# Patient Record
Sex: FEMALE | Race: WHITE | NOT HISPANIC OR LATINO
[De-identification: names, ages, dates, MRNs, and addresses within clinical notes are randomized per-mention and may not be internally consistent; named-entity substitution may affect disease eponyms.]

---
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
## 2020-07-13 RX ORDER — FLUTICASONE PROPIONATE 0.05 MG/G
0.01 OINTMENT TOPICAL TWICE DAILY
Qty: 1 | Refills: 3 | Status: COMPLETED | COMMUNITY
Start: 2019-06-13 | End: 2020-07-13

## 2020-07-13 RX ORDER — LEVOCETIRIZINE DIHYDROCHLORIDE 5 MG/1
5 TABLET ORAL DAILY
Qty: 30 | Refills: 0 | Status: COMPLETED | COMMUNITY
Start: 2018-11-23 | End: 2020-07-13

## 2020-07-18 ENCOUNTER — APPOINTMENT (OUTPATIENT)
Dept: PEDIATRICS | Facility: CLINIC | Age: 20
End: 2020-07-18
Payer: COMMERCIAL

## 2020-07-18 VITALS
SYSTOLIC BLOOD PRESSURE: 108 MMHG | BODY MASS INDEX: 19.53 KG/M2 | WEIGHT: 121.5 LBS | DIASTOLIC BLOOD PRESSURE: 66 MMHG | HEIGHT: 66.25 IN

## 2020-07-18 PROCEDURE — 86580 TB INTRADERMAL TEST: CPT

## 2020-07-18 PROCEDURE — 99395 PREV VISIT EST AGE 18-39: CPT | Mod: 25

## 2020-07-18 NOTE — HISTORY OF PRESENT ILLNESS
[Up to date] : Up to date [Normal] : normal [Eats meals with family] : eats meals with family [Father] : father [Normal Performance] : normal performance [Normal Behavior/Attention] : normal behavior/attention [Normal Homework] : normal homework [Has friends] : has friends [At least 1 hour of physical activity a day] : at least 1 hour of physical activity a day [No] : No cigarette smoke exposure [Has peer relationships free of violence] : has peer relationships free of violence [Uses safety belts/safety equipment] : uses safety belts/safety equipment  [Has ways to cope with stress] : has ways to cope with stress [Yes] : Patient has had sexual intercourse. [HIV Screening Declined] : HIV Screening Declined [Displays self-confidence] : displays self-confidence [Uses electronic nicotine delivery system] : does not use electronic nicotine delivery system [Uses tobacco] : does not use tobacco [Drinks alcohol] : does not drink alcohol [Impaired/distracted driving] : no impaired/distracted driving [de-identified] : [employer] [de-identified] : Monogamous [FreeTextEntry1] : 18 yo for HM visit,immunizations UTD

## 2020-07-18 NOTE — PHYSICAL EXAM
[Alert] : alert [No Acute Distress] : no acute distress [Normocephalic] : normocephalic [EOMI Bilateral] : EOMI bilateral [Clear tympanic membranes with bony landmarks and light reflex present bilaterally] : clear tympanic membranes with bony landmarks and light reflex present bilaterally  [Pink Nasal Mucosa] : pink nasal mucosa [Nonerythematous Oropharynx] : nonerythematous oropharynx [Supple, full passive range of motion] : supple, full passive range of motion [No Palpable Masses] : no palpable masses [Clear to Auscultation Bilaterally] : clear to auscultation bilaterally [Regular Rate and Rhythm] : regular rate and rhythm [Normal S1, S2 audible] : normal S1, S2 audible [No Murmurs] : no murmurs [+2 Femoral Pulses] : +2 femoral pulses [Soft] : soft [NonTender] : non tender [Normoactive Bowel Sounds] : normoactive bowel sounds [Non Distended] : non distended [No Splenomegaly] : no splenomegaly [No Hepatomegaly] : no hepatomegaly [Luis Miguel: _____] : Luis Miguel [unfilled] [No Abnormal Lymph Nodes Palpated] : no abnormal lymph nodes palpated [Normal External Genitalia] : normal external genitalia [No Gait Asymmetry] : no gait asymmetry [No pain or deformities with palpation of bone, muscles, joints] : no pain or deformities with palpation of bone, muscles, joints [Normal Muscle Tone] : normal muscle tone [+2 Patella DTR] : +2 patella DTR [Straight] : straight [No Rash or Lesions] : no rash or lesions [Cranial Nerves Grossly Intact] : cranial nerves grossly intact

## 2020-07-18 NOTE — DISCUSSION/SUMMARY
[Met privately with the adolescent for part of the office visit?] : Met privately with the adolescent for part of the office visit? Yes [Adolescent demonstrates understanding of his/her conditions and how to take prescribed medications?] : Adolescent demonstrates understanding of his/her conditions and how to take prescribed medications? Yes [Adolescent asks questions during each office  visit and participates in the care plan?] : Adolescent asks questions during each office visit and participates in the care plan? Yes [Initiated discussion about transfer to an adult healthcare provider.  Provided copy of transition letter?] : Initiated discussion about transfer to an adult healthcare provider.  Provided copy of transition letter? Yes [Transferred health records?] : Transferred health records? No [FreeTextEntry1] : 18 yo for HM exam and PPD\par no desire to transition\par wt loss 4.5 lbs in past year\par PE Unremarkable\par PPD planted\par Hep B titers ordered\par discussed need for Flu Vax, Continue Covid precautions\par Questions answered\par

## 2020-07-20 ENCOUNTER — APPOINTMENT (OUTPATIENT)
Dept: PEDIATRICS | Facility: CLINIC | Age: 20
End: 2020-07-20
Payer: COMMERCIAL

## 2020-07-20 VITALS — HEIGHT: 66 IN | WEIGHT: 120 LBS | BODY MASS INDEX: 19.29 KG/M2

## 2020-07-20 PROCEDURE — 86580 TB INTRADERMAL TEST: CPT

## 2020-07-20 RX ORDER — NORETHINDRONE ACETATE AND ETHINYL ESTRADIOL 1MG-20(24)
1-20 KIT ORAL
Qty: 84 | Refills: 0 | Status: ACTIVE | COMMUNITY
Start: 2018-09-12

## 2020-07-27 ENCOUNTER — APPOINTMENT (OUTPATIENT)
Dept: PEDIATRICS | Facility: CLINIC | Age: 20
End: 2020-07-27
Payer: COMMERCIAL

## 2020-07-27 PROCEDURE — 86580 TB INTRADERMAL TEST: CPT

## 2020-07-29 ENCOUNTER — APPOINTMENT (OUTPATIENT)
Dept: PEDIATRICS | Facility: CLINIC | Age: 20
End: 2020-07-29
Payer: COMMERCIAL

## 2020-07-29 PROCEDURE — 99211 OFF/OP EST MAY X REQ PHY/QHP: CPT

## 2020-08-15 LAB — HBV SURFACE AB SER QL: NONREACTIVE

## 2020-11-17 RX ORDER — CLINDAMYCIN PHOSPHATE 1 G/10ML
1 GEL TOPICAL TWICE DAILY
Qty: 1 | Refills: 3 | Status: ACTIVE | COMMUNITY
Start: 2018-08-15 | End: 1900-01-01

## 2020-12-02 ENCOUNTER — APPOINTMENT (OUTPATIENT)
Dept: PEDIATRICS | Facility: CLINIC | Age: 20
End: 2020-12-02
Payer: COMMERCIAL

## 2020-12-02 PROCEDURE — 99072 ADDL SUPL MATRL&STAF TM PHE: CPT

## 2020-12-02 PROCEDURE — 90471 IMMUNIZATION ADMIN: CPT

## 2020-12-02 PROCEDURE — 90746 HEPB VACCINE 3 DOSE ADULT IM: CPT

## 2020-12-02 NOTE — HISTORY OF PRESENT ILLNESS
[Hepatitis B] : Hepatitis B [FreeTextEntry1] : MALU is here for vaccine update, no complaints, last well check up 7/18/2020. 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 No concerns.\par

## 2021-01-05 ENCOUNTER — APPOINTMENT (OUTPATIENT)
Dept: PEDIATRICS | Facility: CLINIC | Age: 21
End: 2021-01-05
Payer: COMMERCIAL

## 2021-01-05 PROCEDURE — 99072 ADDL SUPL MATRL&STAF TM PHE: CPT

## 2021-01-05 PROCEDURE — 90746 HEPB VACCINE 3 DOSE ADULT IM: CPT

## 2021-01-05 PROCEDURE — 90471 IMMUNIZATION ADMIN: CPT

## 2021-01-05 NOTE — DISCUSSION/SUMMARY
[] : The components of the vaccine(s) to be administered today are listed in the plan of care. The disease(s) for which the vaccine(s) are intended to prevent and the risks have been discussed with the caretaker.  The risks are also included in the appropriate vaccination information statements which have been provided to the patient's caregiver.  The caregiver has given consent to vaccinate. [FreeTextEntry1] : Call for fever or problems, #3 in 6 months\par

## 2021-01-05 NOTE — HISTORY OF PRESENT ILLNESS
[Hepatitis B] : Hepatitis B [FreeTextEntry1] : MALU is here for vaccine update, no complaints, last well check up 7/18/2020. 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

## 2021-03-23 DIAGNOSIS — Z00.00 ENCOUNTER FOR GENERAL ADULT MEDICAL EXAMINATION W/OUT ABNORMAL FINDINGS: ICD-10-CM

## 2021-05-25 ENCOUNTER — APPOINTMENT (OUTPATIENT)
Dept: PEDIATRICS | Facility: CLINIC | Age: 21
End: 2021-05-25
Payer: COMMERCIAL

## 2021-05-25 VITALS — WEIGHT: 118 LBS | TEMPERATURE: 97.9 F

## 2021-05-25 DIAGNOSIS — Z87.09 PERSONAL HISTORY OF OTHER DISEASES OF THE RESPIRATORY SYSTEM: ICD-10-CM

## 2021-05-25 DIAGNOSIS — L24.9 IRRITANT CONTACT DERMATITIS, UNSPECIFIED CAUSE: ICD-10-CM

## 2021-05-25 DIAGNOSIS — K14.0 GLOSSITIS: ICD-10-CM

## 2021-05-25 DIAGNOSIS — H10.12 ACUTE ATOPIC CONJUNCTIVITIS, LEFT EYE: ICD-10-CM

## 2021-05-25 PROCEDURE — 99213 OFFICE O/P EST LOW 20 MIN: CPT

## 2021-05-25 PROCEDURE — 99072 ADDL SUPL MATRL&STAF TM PHE: CPT

## 2021-05-25 RX ORDER — FLUTICASONE PROPIONATE 0.5 MG/G
0.05 CREAM TOPICAL TWICE DAILY
Qty: 1 | Refills: 1 | Status: ACTIVE | COMMUNITY
Start: 2021-05-25 | End: 1900-01-01

## 2021-05-25 RX ORDER — DAPSONE 50 MG/G
5 GEL TOPICAL
Qty: 1 | Refills: 3 | Status: ACTIVE | COMMUNITY
Start: 2021-05-25 | End: 1900-01-01

## 2021-05-25 RX ORDER — FLUTICASONE PROPIONATE 0.05 MG/G
0.01 OINTMENT TOPICAL TWICE DAILY
Qty: 1 | Refills: 2 | Status: COMPLETED | COMMUNITY
Start: 2020-08-12 | End: 2021-05-25

## 2021-05-25 NOTE — DISCUSSION/SUMMARY
[FreeTextEntry1] : symptomatic treatment of rash, skin care, hot wash linens, alternate creams, moistuizer in between, she knows it will worsen in sun or sweat, call if no better. She has an apt in 2 weeks, if the rash persists we will consult with Allergy.\par

## 2021-05-25 NOTE — PHYSICAL EXAM
[NL] : clear to auscultation bilaterally [FreeTextEntry5] : no redness, no discharge [de-identified] : papular scattered lesions on upper chest, upper arms and posterior trunk, one lesion on her abdomen, spares face, legs and feet

## 2021-05-25 NOTE — HISTORY OF PRESENT ILLNESS
[de-identified] : rash [FreeTextEntry6] : Renuka is here with complaints of a recurrent rash on her chest that comes and goes. She was seen last year by her DERM and told it was acne but there is no improvement with acne cream and since 2 days it has spread to her upper arms and back. She noticed it (she has photos) at school this year but Sunday woke with it much worse than before. She went to the beach that day and now it is on her back and arms. The rash is not itchy, tender or draining, no new food or soap. She switched to hypoallergenic soap. No bug exposure, no one else in home has rash. She is eating and sleeping normally, no cold sx, no COVID vaccine yet.

## 2021-06-10 ENCOUNTER — APPOINTMENT (OUTPATIENT)
Dept: PEDIATRICS | Facility: CLINIC | Age: 21
End: 2021-06-10

## 2021-07-15 ENCOUNTER — APPOINTMENT (OUTPATIENT)
Dept: PEDIATRICS | Facility: CLINIC | Age: 21
End: 2021-07-15
Payer: COMMERCIAL

## 2021-07-15 DIAGNOSIS — Z23 ENCOUNTER FOR IMMUNIZATION: ICD-10-CM

## 2021-07-15 PROCEDURE — 86580 TB INTRADERMAL TEST: CPT

## 2021-07-15 PROCEDURE — 99072 ADDL SUPL MATRL&STAF TM PHE: CPT

## 2021-07-15 PROCEDURE — 90746 HEPB VACCINE 3 DOSE ADULT IM: CPT

## 2021-07-15 PROCEDURE — 90471 IMMUNIZATION ADMIN: CPT

## 2021-07-15 NOTE — DISCUSSION/SUMMARY
[] : The components of the vaccine(s) to be administered today are listed in the plan of care. The disease(s) for which the vaccine(s) are intended to prevent and the risks have been discussed with the caretaker.  The risks are also included in the appropriate vaccination information statements which have been provided to the patient's caregiver.  The caregiver has given consent to vaccinate. [FreeTextEntry1] : Call for fever or problems, PPD check in 2 days left forearm\par

## 2021-07-15 NOTE — HISTORY OF PRESENT ILLNESS
[Hepatitis B] : Hepatitis B [PPD] : PPD [FreeTextEntry1] : MALU is here for vaccine update, no complaints, last well check up 7/18/2020.\par

## 2021-07-17 ENCOUNTER — APPOINTMENT (OUTPATIENT)
Dept: PEDIATRICS | Facility: CLINIC | Age: 21
End: 2021-07-17
Payer: COMMERCIAL

## 2021-07-17 PROCEDURE — ZZZZZ: CPT

## 2021-07-20 PROBLEM — Z11.1 ENCOUNTER FOR PPD TEST: Status: RESOLVED | Noted: 2021-07-15 | Resolved: 2021-07-22

## 2021-07-20 PROBLEM — Z02.89 ENCOUNTER FOR COMPLETION OF FORM WITH PATIENT: Status: RESOLVED | Noted: 2021-07-15 | Resolved: 2021-07-22

## 2021-07-22 ENCOUNTER — APPOINTMENT (OUTPATIENT)
Dept: PEDIATRICS | Facility: CLINIC | Age: 21
End: 2021-07-22
Payer: COMMERCIAL

## 2021-07-22 DIAGNOSIS — Z02.89 ENCOUNTER FOR OTHER ADMINISTRATIVE EXAMINATIONS: ICD-10-CM

## 2021-07-22 DIAGNOSIS — Z11.1 ENCOUNTER FOR SCREENING FOR RESPIRATORY TUBERCULOSIS: ICD-10-CM

## 2021-07-22 PROCEDURE — 99211 OFF/OP EST MAY X REQ PHY/QHP: CPT | Mod: 25

## 2021-07-22 PROCEDURE — 86580 TB INTRADERMAL TEST: CPT

## 2021-07-22 PROCEDURE — 99072 ADDL SUPL MATRL&STAF TM PHE: CPT

## 2021-07-22 NOTE — HISTORY OF PRESENT ILLNESS
[PPD] : PPD [FreeTextEntry1] : MALU is here for PPD placement, no complaints, well check up 7/18/2020. This is her second PPD. Her first 7/15/21 was NEG.\par

## 2021-07-24 ENCOUNTER — APPOINTMENT (OUTPATIENT)
Dept: PEDIATRICS | Facility: CLINIC | Age: 21
End: 2021-07-24
Payer: COMMERCIAL

## 2021-07-24 PROCEDURE — ZZZZZ: CPT

## 2023-07-15 NOTE — HISTORY OF PRESENT ILLNESS
No
[FreeTextEntry6] : H/o a rash on the chest and back which is pruritic, no known cause. It was noted about one week ago and is spreading.

## 2024-02-11 ENCOUNTER — NON-APPOINTMENT (OUTPATIENT)
Age: 24
End: 2024-02-11

## 2024-02-12 ENCOUNTER — EMERGENCY (EMERGENCY)
Facility: HOSPITAL | Age: 24
LOS: 1 days | Discharge: HOME | End: 2024-02-12
Admitting: STUDENT IN AN ORGANIZED HEALTH CARE EDUCATION/TRAINING PROGRAM
Payer: COMMERCIAL

## 2024-02-12 VITALS
HEART RATE: 109 BPM | OXYGEN SATURATION: 98 % | SYSTOLIC BLOOD PRESSURE: 124 MMHG | TEMPERATURE: 100 F | DIASTOLIC BLOOD PRESSURE: 82 MMHG | RESPIRATION RATE: 20 BRPM

## 2024-02-12 VITALS
TEMPERATURE: 98 F | DIASTOLIC BLOOD PRESSURE: 68 MMHG | OXYGEN SATURATION: 99 % | SYSTOLIC BLOOD PRESSURE: 102 MMHG | RESPIRATION RATE: 18 BRPM | HEART RATE: 86 BPM

## 2024-02-12 LAB
ALBUMIN SERPL ELPH-MCNC: 4 G/DL — SIGNIFICANT CHANGE UP (ref 3.3–5)
ALP SERPL-CCNC: 68 U/L — SIGNIFICANT CHANGE UP (ref 40–120)
ALT FLD-CCNC: 18 U/L — SIGNIFICANT CHANGE UP (ref 4–33)
ANION GAP SERPL CALC-SCNC: 12 MMOL/L — SIGNIFICANT CHANGE UP (ref 7–14)
AST SERPL-CCNC: 19 U/L — SIGNIFICANT CHANGE UP (ref 4–32)
BASOPHILS # BLD AUTO: 0.04 K/UL — SIGNIFICANT CHANGE UP (ref 0–0.2)
BASOPHILS NFR BLD AUTO: 0.2 % — SIGNIFICANT CHANGE UP (ref 0–2)
BILIRUB SERPL-MCNC: 0.5 MG/DL — SIGNIFICANT CHANGE UP (ref 0.2–1.2)
BUN SERPL-MCNC: 10 MG/DL — SIGNIFICANT CHANGE UP (ref 7–23)
CALCIUM SERPL-MCNC: 8.9 MG/DL — SIGNIFICANT CHANGE UP (ref 8.4–10.5)
CHLORIDE SERPL-SCNC: 103 MMOL/L — SIGNIFICANT CHANGE UP (ref 98–107)
CO2 SERPL-SCNC: 22 MMOL/L — SIGNIFICANT CHANGE UP (ref 22–31)
CREAT SERPL-MCNC: 0.61 MG/DL — SIGNIFICANT CHANGE UP (ref 0.5–1.3)
EGFR: 129 ML/MIN/1.73M2 — SIGNIFICANT CHANGE UP
EOSINOPHIL # BLD AUTO: 0 K/UL — SIGNIFICANT CHANGE UP (ref 0–0.5)
EOSINOPHIL NFR BLD AUTO: 0 % — SIGNIFICANT CHANGE UP (ref 0–6)
GLUCOSE SERPL-MCNC: 96 MG/DL — SIGNIFICANT CHANGE UP (ref 70–99)
HCT VFR BLD CALC: 36.2 % — SIGNIFICANT CHANGE UP (ref 34.5–45)
HGB BLD-MCNC: 12.6 G/DL — SIGNIFICANT CHANGE UP (ref 11.5–15.5)
IANC: 16.43 K/UL — HIGH (ref 1.8–7.4)
IMM GRANULOCYTES NFR BLD AUTO: 0.4 % — SIGNIFICANT CHANGE UP (ref 0–0.9)
LYMPHOCYTES # BLD AUTO: 0.29 K/UL — LOW (ref 1–3.3)
LYMPHOCYTES # BLD AUTO: 1.7 % — LOW (ref 13–44)
MCHC RBC-ENTMCNC: 30.7 PG — SIGNIFICANT CHANGE UP (ref 27–34)
MCHC RBC-ENTMCNC: 34.8 GM/DL — SIGNIFICANT CHANGE UP (ref 32–36)
MCV RBC AUTO: 88.1 FL — SIGNIFICANT CHANGE UP (ref 80–100)
MONOCYTES # BLD AUTO: 0.15 K/UL — SIGNIFICANT CHANGE UP (ref 0–0.9)
MONOCYTES NFR BLD AUTO: 0.9 % — LOW (ref 2–14)
NEUTROPHILS # BLD AUTO: 16.43 K/UL — HIGH (ref 1.8–7.4)
NEUTROPHILS NFR BLD AUTO: 96.8 % — HIGH (ref 43–77)
NRBC # BLD: 0 /100 WBCS — SIGNIFICANT CHANGE UP (ref 0–0)
NRBC # FLD: 0 K/UL — SIGNIFICANT CHANGE UP (ref 0–0)
PLATELET # BLD AUTO: 335 K/UL — SIGNIFICANT CHANGE UP (ref 150–400)
POTASSIUM SERPL-MCNC: 3.4 MMOL/L — LOW (ref 3.5–5.3)
POTASSIUM SERPL-SCNC: 3.4 MMOL/L — LOW (ref 3.5–5.3)
PROT SERPL-MCNC: 7.4 G/DL — SIGNIFICANT CHANGE UP (ref 6–8.3)
RBC # BLD: 4.11 M/UL — SIGNIFICANT CHANGE UP (ref 3.8–5.2)
RBC # FLD: 13.1 % — SIGNIFICANT CHANGE UP (ref 10.3–14.5)
SODIUM SERPL-SCNC: 137 MMOL/L — SIGNIFICANT CHANGE UP (ref 135–145)
WBC # BLD: 16.98 K/UL — HIGH (ref 3.8–10.5)
WBC # FLD AUTO: 16.98 K/UL — HIGH (ref 3.8–10.5)

## 2024-02-12 PROCEDURE — 70491 CT SOFT TISSUE NECK W/DYE: CPT | Mod: 26,MA

## 2024-02-12 PROCEDURE — 99285 EMERGENCY DEPT VISIT HI MDM: CPT

## 2024-02-12 RX ORDER — SODIUM CHLORIDE 9 MG/ML
1000 INJECTION INTRAMUSCULAR; INTRAVENOUS; SUBCUTANEOUS ONCE
Refills: 0 | Status: COMPLETED | OUTPATIENT
Start: 2024-02-12 | End: 2024-02-12

## 2024-02-12 RX ORDER — AZITHROMYCIN 500 MG/1
500 TABLET, FILM COATED ORAL ONCE
Refills: 0 | Status: COMPLETED | OUTPATIENT
Start: 2024-02-12 | End: 2024-02-12

## 2024-02-12 RX ORDER — ACETAMINOPHEN 500 MG
1000 TABLET ORAL ONCE
Refills: 0 | Status: COMPLETED | OUTPATIENT
Start: 2024-02-12 | End: 2024-02-12

## 2024-02-12 RX ORDER — POTASSIUM CHLORIDE 20 MEQ
40 PACKET (EA) ORAL ONCE
Refills: 0 | Status: COMPLETED | OUTPATIENT
Start: 2024-02-12 | End: 2024-02-12

## 2024-02-12 RX ORDER — IBUPROFEN 200 MG
1 TABLET ORAL
Qty: 16 | Refills: 0
Start: 2024-02-12 | End: 2024-02-15

## 2024-02-12 RX ORDER — AZITHROMYCIN 500 MG/1
1 TABLET, FILM COATED ORAL
Qty: 5 | Refills: 0
Start: 2024-02-12 | End: 2024-02-16

## 2024-02-12 RX ADMIN — Medication 40 MILLIEQUIVALENT(S): at 21:55

## 2024-02-12 RX ADMIN — SODIUM CHLORIDE 1000 MILLILITER(S): 9 INJECTION INTRAMUSCULAR; INTRAVENOUS; SUBCUTANEOUS at 20:22

## 2024-02-12 RX ADMIN — AZITHROMYCIN 500 MILLIGRAM(S): 500 TABLET, FILM COATED ORAL at 23:03

## 2024-02-12 RX ADMIN — Medication 400 MILLIGRAM(S): at 20:24

## 2024-02-12 NOTE — ED PROVIDER NOTE - THROAT FINDINGS
right sided dark tonsil swelling/THROAT RED/uvula midline/no vesicles/TONSILLAR SWELLING/NO DROOLING

## 2024-02-12 NOTE — ED ADULT TRIAGE NOTE - CHIEF COMPLAINT QUOTE
pt c/o of sore throat since this am, sent from urgent care to r/o peritonsillar abscess, no signs of airway issues or drooling noted

## 2024-02-12 NOTE — ED PROVIDER NOTE - CLINICAL SUMMARY MEDICAL DECISION MAKING FREE TEXT BOX
This is a 23 yr old F, dextrocardia, pcos with c/o throat pain, discomfort while swallowing, started today in the am. She went to [facility] and was referred to er for r/o tonsillar abscess. Denies fever, chills , sob, n, v.

## 2024-02-12 NOTE — ED PROVIDER NOTE - NSFOLLOWUPINSTRUCTIONS_ED_ALL_ED_FT
Tonsillitis is inflammation of your tonsils. Tonsils are the lumps of tissue on both sides of the back of your throat. Tonsils are part of your immune system. They help you fight infections. Recurrent tonsillitis is when you have tonsillitis many times in 1 year. Chronic tonsillitis is when you have a sore throat that lasts 3 months or longer.     DISCHARGE INSTRUCTIONS:    Medicines: You may need any of the following:   Acetaminophen decreases pain and fever. It is available without a doctor's order. Ask how much to take and how often to take it. Follow directions. Acetaminophen can cause liver damage if not taken correctly.    NSAIDs, such as ibuprofen, help decrease swelling, pain, and fever. This medicine is available with or without a doctor's order. NSAIDs can cause stomach bleeding or kidney problems in certain people. If you take blood thinner medicine, always ask your healthcare provider if NSAIDs are safe for you. Always read the medicine label and follow directions.    Antibiotics help treat a bacterial infection.    Take your medicine as directed. Contact your healthcare provider if you think your medicine is not helping or if you have side effects. Tell him or her if you are allergic to any medicine. Keep a list of the medicines, vitamins, and herbs you take. Include the amounts, and when and why you take them. Bring the list or the pill bottles to follow-up visits. Carry your medicine list with you in case of an emergency.    Call 911 for the following:   You have trouble breathing because your tonsils are swollen.    Contact your healthcare provider if:   You have a fever.  Your pain gets worse or does not get better after you take pain medicine.  Your sore throat is not better after you have finished antibiotic treatment.  You have trouble sleeping and wake up trying to catch your breath.      Drink liquids as directed: You may need to drink more liquid than usual to help prevent dehydration. Ask how much liquid to drink each day and which liquids are best for you.     Gargle with warm salt water: This may help decrease throat pain. Mix 1 teaspoon of salt in 8 ounces of warm water. Ask how often you should do this.    Prevent the spread of germs: Wash your hands often. Do not share food or drinks with anyone. You may be able to return to work when you feel better and your fever is gone for at least 24 hours.    Follow up with your healthcare provider as directed: Write down your questions so you remember to ask them during your visits.

## 2024-02-12 NOTE — ED PROVIDER NOTE - OBJECTIVE STATEMENT
This is a 23 yr old F, dextrocardia, pcos with c/o throat pain, discomfort while swallowing, started today in the am. She went to [facility] and was referred to er for r/o tonsillar abscess. This is a 23 yr old F, dextrocardia, pcos with c/o throat pain, discomfort while swallowing, started today in the am. She went to [facility] and was referred to er for r/o tonsillar abscess. Denies fever, chills , sob, n, v.

## 2024-02-12 NOTE — ED ADULT TRIAGE NOTE - TEMPERATURE IN CELSIUS (DEGREES C)
37.7 Topical Clindamycin Counseling: Patient counseled that this medication may cause skin irritation or allergic reactions.  In the event of skin irritation, the patient was advised to reduce the amount of the drug applied or use it less frequently.   The patient verbalized understanding of the proper use and possible adverse effects of clindamycin.  All of the patient's questions and concerns were addressed.

## 2024-02-12 NOTE — ED PROVIDER NOTE - PATIENT PORTAL LINK FT
You can access the FollowMyHealth Patient Portal offered by [facility] by registering at the following website: [email or web address]. By joining [facility]’s FollowMyHealth portal, you will also be able to view your health information using other applications (apps) compatible with our system.

## 2024-02-12 NOTE — ED ADULT NURSE NOTE - OBJECTIVE STATEMENT
Patient received in wellness, exam room 3. Patient A&Ox3 and ambulatory at baseline. Patient referred to the ED from urgent care for evaluation of swollen tonsils. Patient denies significant phx. Patient states today she experienced new onset sore throat; patient states she went to urgent care and was advised to come to ED for further evaluation of swollen tonsils. Patient denies headache, dizziness, lightheadedness, nausea/vomiting, fever/chills, and pain. Patient offers no complaints at this time. Respirations even and unlabored, no signs/symptoms of acute distress; patient able to speak in complete and uninterrupted sentences; patient denies dyspnea, shortness of breath, and chest pain. 20G IV placed to right AC, labs collected and sent, medications administered per eMAR. Patient is stable at this time. Steady gait observed.

## 2024-02-12 NOTE — ED PROVIDER NOTE - CARDIAC, MLM
Normal rate, regular rhythm.  Heart sounds S1, S2.  No murmurs, rubs or gallops. heart on the right side

## 2024-02-17 ENCOUNTER — NON-APPOINTMENT (OUTPATIENT)
Age: 24
End: 2024-02-17

## 2024-02-28 ENCOUNTER — NON-APPOINTMENT (OUTPATIENT)
Age: 24
End: 2024-02-28

## 2025-01-17 ENCOUNTER — NON-APPOINTMENT (OUTPATIENT)
Age: 25
End: 2025-01-17